# Patient Record
Sex: FEMALE | Race: WHITE | NOT HISPANIC OR LATINO | URBAN - METROPOLITAN AREA
[De-identification: names, ages, dates, MRNs, and addresses within clinical notes are randomized per-mention and may not be internally consistent; named-entity substitution may affect disease eponyms.]

---

## 2018-05-17 ENCOUNTER — OFFICE VISIT - RIVER FALLS (OUTPATIENT)
Dept: FAMILY MEDICINE | Facility: CLINIC | Age: 83
End: 2018-05-17

## 2018-05-17 ASSESSMENT — MIFFLIN-ST. JEOR: SCORE: 1048.91

## 2018-07-19 ENCOUNTER — OFFICE VISIT - RIVER FALLS (OUTPATIENT)
Dept: FAMILY MEDICINE | Facility: CLINIC | Age: 83
End: 2018-07-19

## 2018-07-19 ASSESSMENT — MIFFLIN-ST. JEOR: SCORE: 1044.37

## 2018-08-21 ENCOUNTER — OFFICE VISIT - RIVER FALLS (OUTPATIENT)
Dept: FAMILY MEDICINE | Facility: CLINIC | Age: 83
End: 2018-08-21

## 2018-08-21 ASSESSMENT — MIFFLIN-ST. JEOR: SCORE: 1046.41

## 2022-02-12 VITALS
BODY MASS INDEX: 33.53 KG/M2 | WEIGHT: 160.2 LBS | BODY MASS INDEX: 33.63 KG/M2 | HEIGHT: 58 IN | WEIGHT: 159.75 LBS | HEIGHT: 58 IN

## 2022-02-12 VITALS — BODY MASS INDEX: 33.74 KG/M2 | HEIGHT: 58 IN | WEIGHT: 160.75 LBS

## 2022-02-16 NOTE — PROGRESS NOTES
Patient:   KATHRIN BECERRA            MRN: [id number]            FIN: [id number]               Age:   85 years     Sex:  Female     :  1933   Associated Diagnoses:   None   Author:   Anya Calvillo      Doctor: Dr. [facility]   Patient Information  (Medicare and address information is on file)  Medicare HICN#: _  Address:_ City:_ State:_ Zip:_  Home Phone:_ Work Phone:_ Other Contact Phone:_    Diabetes self-management training (DSMT) and medical nutrition therapy (MNT) are individual and complementary services to improve diabetes care. For Medicare beneficiaries, both services can be ordered in the same year. Research indicates MNT combined with DSMT improves outcomes.    Diabetes Self-Managment Training (DSMT)  Medicare: 10 hours initial DSMT in 12 month period, plus 2 hours follow-up annually  *Check type of training services and number of hours requested:  _ Initial DSMT:  10 hours or _ no. hrs. requested  X Follow-up DSMT: X 2 hours or _ no. hrs. requested  _ Additional insulin training: _ no. hrs. requested    *Patients with special needs requiring individual DSMT  Check all special needs that apply:  _ Vision _ Hearing  _ Physical  _Cognitive Impairment  _ Language limitations X Other  No classes offered    *DSMT Content  X All ten content areas, as appropriate  _ Monitoring diabetes    _ Diabetes as disease process  _ Psychological adjustment _ Physical activity  _ Nutritional management  _ Goal setting, problem solving  _ Medications       _ Prevent, detect and treat acute complications  _ Preconception/pregnancy _ Prevent, detect and teat chronic complications     management or gestational     diabetes management    Medical Nutrition Therapy (MNT)  Medicare: 3 hours initial MNT in the first calendar year, plus two hours follow-up MNT annually. Additional MNT hours available for change in medical condition, treament and/or diagnosis.  *Check the type of MNT and/or number  of additional hours requested:  _ Initial MNT:   X Annual follow-up MNT  _ Additional MNT services in the same calendar year, per RD recommendations  _ number of additional hours requested    Please specify change in medical condition, treatment and/or diagnosis      *Diagnosis  Please send recent labs for patient eligibility & outcomes monitoring  _ Type 1 uncontrolled  _ Type 1 controlled  X Type 2 uncontrolled  _ Type 2 controlled  _ Gestational diabetes _ Other _    Complications/Comorbidities  Check all that apply:  X Hypertension   X Dyslipidemia _ Stroke      _ Nephropathy  _ Neuropathy    _ Retinopathy  _ Renal disease   _ CHD  _ PVD       _ Pregnancy  _ Non-healing wound X Obesity    _ Mental/affective disorder     _ Other _    Current Diabetes Medications  Specify type, dose and frequency  Oral: _  Insulin: Lantus 20u, Humalog per correction scale TID ac 151 - 174 +2, 175 - 199 +5, 200 or higher +6  Patient now uses: X Pen _ Needle _ Pump    Patient Behavior Goals/Plan of Care    To gradually lose weight and improve blood sugar control.  Minimize the risk for hypoglycemia and reduce risks of developing complications related to uncontrolled diabetes.    Signature and UPIN #: (UPIN and NPI are on file)  Group/Practice name, address and phone: [facility], [facility] [facility]  [phone] [phone]

## 2022-02-16 NOTE — PROGRESS NOTES
Patient:   KATHRIN BECERRA            MRN: [id number]            FIN: [id number]               Age:   85 years     Sex:  Female     :  1933   Associated Diagnoses:   Hypertension; Diabetes; Hyperlipidemia, mixed   Author:   Anya Calvillo      Visit Information   Visit type:  Follow up Diabetes Self Management Education - last seen by RD 18.    Accompanied by:  Son.    Source of history:  Self, Family member.    Referral source:  Micki TYSON, [place], Dr. [facility] .       Chief Complaint   DM Type II on insulin, CKD, Hyperlipidemia, HTN      History of Present Illness   Pt hospitalized 2018 due to hypoglycemia.  During hospitalization insulin regimen adjusted.  Pt does have dx of dementia and during hospitalization pt demonstrated accurate use of pen and son was instructed on monitor BG and insulin more closely.    Pt is here today for ongoing evaluation of BG control and DSME      A1C 9.4% 1/3/18   A1C 8.1% 18    Current insulin   Lantus 20   Humalog per correction scale TID ac   0 - 150   No correction  151 - 174  +2  175 - 199  +5  >= 200  +6    BG in office today 216m/dL   Testing 3 x/ day TID ac   7 day avg 155  14 day avg 160  30 day avg 177    Pt is working on diet but she also states she loves sweets and will have chocolate candy bars daily  am eating more eggs and 1 slice toast, noon meal sandwich, evening starch, protein and vegetable, pt is still drinking some juice because she likes it  Exercise: none      Health Status   Allergies:    Allergic Reactions (Selected)  Severity Not Documented  Avandia (No reactions were documented)  Byetta Prefilled Pen (Weakness)  Lasix (Nausea and vomiting)  Niaspan ER (Flush)   Problem list:    All Problems  Endometrial adenocarcinoma / SNOMED CT 562024127 / Confirmed  Generalized weakness / SNOMED CT 68032857 / Confirmed  CKD (chronic kidney disease) / SNOMED CT 5394931161 / Confirmed  Dementia / SNOMED CT 15405756 /  Confirmed  Diabetes / SNOMED CT 103068468 / Confirmed  Fatigue / SNOMED CT 860761370 / Confirmed  Shingles / SNOMED CT 6215015 / Confirmed  Hypertension / SNOMED CT 1207550615 / Confirmed  Severe diabetic hypoglycemia / SNOMED CT 009745165 / Confirmed  Hypokalemia / SNOMED CT 58446374 / Confirmed  Elevated lactic acid level / SNOMED CT 55642603 / Confirmed  Hyperlipidemia, mixed / SNOMED CT 585787285 / Confirmed  Actinic keratoses / SNOMED CT 8320861356 / Confirmed  Obstructive sleep apnea / SNOMED CT 015072819 / Confirmed  Osteoarthritis / SNOMED CT 2863829814 / Confirmed  Osteoporosis / SNOMED CT 345783506 / Confirmed  Postmenopausal bleeding / SNOMED CT 835076901 / Confirmed  Asymptomatic PVCs / SNOMED CT 08719364 / Confirmed  Resolved: Basal cell carcinoma (BCC) of right ear / SNOMED CT 9988315060  Resolved: Bell's palsy / SNOMED CT 605728707  Resolved: Cholelithiasis / SNOMED CT 929836507  Resolved: Cataract / SNOMED CT 156655604  Resolved: Bronchitis, chronic / SNOMED CT 818398947  Resolved: Diastolic dysfunction / SNOMED CT 0911848  Resolved: Fracture / SNOMED CT 276688339  Resolved: Radial head fracture / SNOMED CT 127803984  Resolved: History of tobacco use / SNOMED CT 2810643846  Resolved: Breast cancer / SNOMED CT 617054049  Resolved: Tubal pregnancy / SNOMED CT 306043864  Resolved: Ventricular hypertrophy / SNOMED CT 742153040  Canceled: CKD (chronic kidney disease) / SNOMED CT 3325465707      Histories   Past Medical History:    Active  Postmenopausal bleeding (207230140)  Osteoarthritis (3026131265)  Comments:  4/30/2018 CDT 10:56 AM CDT - Cheri Padilla  Knee  Fatigue (036783012)  Diabetes (539426141)  Comments:  4/30/2018 CDT 10:52 AM CDT - Cheri Padilla  Type 2 diabetes mellitus with chronic kidney disease.  Actinic keratoses (0733203274)  Hypertension (0720645530)  Hyperlipidemia, mixed (092761609)  Osteoporosis (014261344)  Obstructive sleep apnea (048595555)  Comments:  4/30/2018 CDT 11:06 AM CDT -  Cheri Padilla  Uses CPAP  Endometrial adenocarcinoma (717950473)  Shingles (4415686)  Comments:  4/30/2018 CDT 11:15 AM Cheri Jackson  Right arm.  Dementia (14750599)  CKD (chronic kidney disease) (3504296945)  Comments:  4/30/2018 CDT 11:53 AM Cheri Jackson  Stage 3.  GFR 30-59 m/min (HRC)  Generalized weakness (62570221)  Elevated lactic acid level (58844846)  Hypokalemia (37009512)  Severe diabetic hypoglycemia (400955061)  Resolved  Cataract (203242827): Onset on 6/2/2011 at 78 years.  Resolved.  Radial head fracture (541206084): Onset in 2001 at 67 years.  Resolved.  Comments:  4/30/2018 CDT 11:13 AM Cheri Jackson  Left wrist  Bell's palsy (533117059): Onset in 1979 at 45 years.  Resolved.  Comments:  4/30/2018 CDT 11:10 AM Cheri Jackson  Left  Fracture (100602416): Onset in 1972 at 38 years.  Resolved.  Comments:  4/30/2018 CDT 11:14 AM Cheri Jackson  Tibia/fibula  Tubal pregnancy (473095316): Onset in 1958 at 24 years.  Resolved.  Basal cell carcinoma (BCC) of right ear (9811269683):  Resolved in 2010 at 76 years.  Comments:  4/30/2018 CDT 11:08 AM Cheri Jackson  Right superior helix.  Treated with Mohs.  Cholelithiasis (933801695):  Resolved.  Diastolic dysfunction (1516695):  Resolved.  Comments:  4/30/2018 CDT 11:12 AM Cheri Jackson  Seen on EKG.  Bronchitis, chronic (580072332):  Resolved.  History of tobacco use (4233295114):  Resolved.  Breast cancer (726205769):  Resolved.  Ventricular hypertrophy (322573537):  Resolved.  Comments:  4/30/2018 CDT 11:17 AM Cheri Jackson  By EKG   Family History:    Heart disease  Sister  Diabetes  Grandmother (M)  Grandmother (P)  Mother  Sister  Sister  Son  Colon cancer  Father     Procedure history:    CT angiography of head and neck (SNOMED CT 3449744387) on 4/12/2018 at 85 Years.  Endometrial biopsy (SNOMED CT 1212964008) on 2/15/2018 at 84 Years.  Comments:  4/30/2018 11:43 AM - Cheri Padilla  Atypical complex hyperplasia with a  squamous morule formation and mucinous metaplasia, in the background of disordered proliferative endometrium.  Dilation and curettage (SNOMED CT 94830164) on 12/20/2017 at 84 Years.  Comments:  4/30/2018 11:44 AM - Cheri Padilla  With hysteroscopy.  Mammogram (SNOMED CT 80766) on 6/5/2015 at 82 Years.  Comments:  4/30/2018 11:45 AM - Cheri Padilla  Negative  Polysomnogram (SNOMED CT 255609477) in the month of 10/2005 at 72 Years.  Endometrial biopsy (SNOMED CT 0512054646) in the month of 8/1999 at 66 Years.  Mastectomy (SNOMED CT 4700701426) in 1977 at 44 Years.  Comments:  4/30/2018 11:48 AM - Cheri Padilla  Right breast  Salpingo-oophorectomy (SNOMED CT 396101496) in 1958 at 25 Years.  Mohs surgery (SNOMED CT 5823541984).  Comments:  4/30/2018 11:09 AM - Cheri Padilla  Right superior helix.  Basal cell carcinoma.   Social History:        Home and Environment Assessment            Marital status: .  Lives with Self.        Physical Examination   Measurements from flowsheet : Measurements   7/19/2018 3:11 PM CDT Height Measured - Standard 58 in    Weight Measured - Standard 159.75 lb    BSA 1.72 m2    Body Mass Index 33.38 kg/m2  HI         Health Maintenance      Recommendations     Pending (in the next year)        OverDue           Tetanus Vaccine due  06/24/16  and every 10  year(s)        Due            DM - Communication with Managing Provider due  07/19/18  and every 1  year(s)           DM - Eye Exam due  07/19/18  and every 1  year(s)           DM - Foot Exam due  07/19/18  and every 1  year(s)           DM - HgbA1c due  07/19/18  Variable frequency           DM - Microalbumin due  07/19/18  and every 1  year(s)           Depression Screen (Female) due  07/19/18  and every 1  year(s)           Fall Risk Screen (Female) due  07/19/18  and every 1  year(s)           HIV Screen (if sexually active) (Female) due  07/19/18  and every 1  year(s)           High Blood Pressure Screen (Female) due  07/19/18  and  "every 1  year(s)           Lipid Disorders Screen (Female) due  07/19/18  and every 1  year(s)           Osteoporosis Screen due  07/19/18  and every 2  year(s)           STD Counseling (if sexually active) (Female) due  07/19/18  and every 1  year(s)           Syphilis Screen (if sexually active) (Female) due  07/19/18  and every 1  year(s)           Tobacco Use Screen (Female) due  07/19/18  and every 1  year(s)           Zoster/Shingles Vaccine due  07/19/18  One-time only        Due In Future            Influenza Vaccine not due until  09/20/18  and every 1  year(s)     Satisfied (in the past 1 year)        Satisfied            Body Mass Index Check (Female) on  07/19/18.           Body Mass Index Check (Female) on  05/17/18.           Influenza Vaccine on  09/20/17.           Pneumococcal Vaccine on  09/20/17.          Impression and Plan   Diagnosis     Hypertension ([id number]-IQ I10).     Diabetes (XSY40-FW E11.9).     Hyperlipidemia, mixed (IXY54-FW E78.2).         Counseled: Patient, Further instruction on AADE7 Self Care Behaviors    Healthy Eating - Provided additional meal and snack ideas.  Again gave basic education on how foods/ fluids affect glucose.  Developed a list with pt on snacks that are lower in carbs where pt would not need to cover snack with insulin and help stabelize glucose.  Pt is also regular juice and is recommended to choose lower carb options.    Being Active - Encouraged walking q hr for a couple of minutes  Monitoring - Reviewed ecommended testing schedule and blood glucose target levels.    Taking Medication - Discussed potential options to add a base dose of Humalog to meals but that was too confusing for pt - decided to slightly adjust \"sliding scale\" and follow up in a couple of months.  Pt's BG fairly well controlled in am - will keep Lantus dose at 20u   Problem Solving -  medical support team assistance, resources provided (written); good control of stress  Healthy Coping - " support of friends, family, and medical support team   Reducing Risks - Detailed education on potential complications associated with uncontrolled diabetes and prevention recommendations.  Recommendations include: annual eye exam, good oral cares with brushing BID and flossing daily, routine dental apts, good foot care tips and shoewear - discussed monofilament test yearly.  Importance of adequate sleep and good control of BG to prevent developing complications related to uncontrolled DM including nephropathy, neuropathy, retinopathy, and cardiovascular disease.  Weight loss goal of 7 - 10% of current body weight pounds as a reasonable goal to help increase insulin sensitivity      Goals:   1.  Practice healthy stress management and get good quality sleep with the goal of 7-8 hours per night.    2.  Increase activity as able 5 min q hr x 4 hrs x 3 days/ week   3.  Eat in a healthy way, per food guide pyramid.  Lower carb snack and fluids, small portion of potato   4.  Pt to monitor BG TID.  BG goals: Fasting and before meals 100 - 140 mg/dL, 2 hrs after the start of a meal 100 - 160 mg/dL.    5.  Goal weight 150#   6.  Lantus 20u  Humalog per correction scale TID ac   0 - 150   +1  151 - 174  +3  175 - 199  +5  >= 200  +7  7.  Follow up in 3 mo          Professional Services   Time spent with pt 60 min   cc Dr. Sweet   cc Dr. Mathew

## 2022-02-16 NOTE — PROGRESS NOTES
Patient:   KATHRIN BECERRA            MRN: [id number]            FIN: [id number]               Age:   85 years     Sex:  Female     :  1933   Associated Diagnoses:   Hypertension; Diabetes; Hyperlipidemia, mixed   Author:   Anya Calvillo      Visit Information   Visit type:  Medical Nutrition Therapy for diabetes management .    Accompanied by:  Son.    Source of history:  Self, Family member.    Referral source:  Micki TYSON, [place], [facility] .       Chief Complaint   DM Type II on insulin, CKD, Hyperlipidemia, HTN      History of Present Illness   Pt hospitalized 2018 due to hypoglycemia.  During hospitalization insulin regimen adjusted - pt's son notes her Lantus is at about 1/2 as much as it was.  Pt does have dx of dementia and during hospitalization pt demonstrated accurate use of pen and son has become more involved in pt's care.        A1C  9.4%   1/3/18   A1C  8.1%  18  A1C  8.5%  18    Current insulin   Lantus 20   Humalog per correction scale TID ac   0 - 150   +1  151 - 174  +3  175 - 199  +5  >= 200  +7    BG in office today 176m/dL   Testing 3 x/ day TID ac   7 day avg 162  14 day avg 164  30 day avg 165 slightly improved     Pt is working on diet but she also states she loves sweets and will have chocolate candy bars daily  am eating more eggs and 1 slice toast, noon meal sandwich, evening starch, protein and vegetable, did stop drinking juice   Exercise: none      Health Status   Allergies:    Allergic Reactions (Selected)  Severity Not Documented  Avandia (No reactions were documented)  Byetta Prefilled Pen (Weakness)  Lasix (Nausea and vomiting)  Niaspan ER (Flush)   Problem list:    All Problems  Endometrial adenocarcinoma / SNOMED CT 293758357 / Confirmed  Generalized weakness / SNOMED CT 71499948 / Confirmed  CKD (chronic kidney disease) / SNOMED CT 9239384994 / Confirmed  Dementia / SNOMED CT 25088342 / Confirmed  Diabetes / SNOMED CT 896738718 /  Confirmed  Fatigue / SNOMED CT 927003467 / Confirmed  Shingles / SNOMED CT 1363056 / Confirmed  Hypertension / SNOMED CT 6876426510 / Confirmed  Severe diabetic hypoglycemia / SNOMED CT 816744958 / Confirmed  Hypokalemia / SNOMED CT 78466889 / Confirmed  Elevated lactic acid level / SNOMED CT 94560026 / Confirmed  Hyperlipidemia, mixed / SNOMED CT 818091093 / Confirmed  Actinic keratoses / SNOMED CT 5111744979 / Confirmed  Obstructive sleep apnea / SNOMED CT 042476383 / Confirmed  Osteoarthritis / SNOMED CT 9188840601 / Confirmed  Osteoporosis / SNOMED CT 326552581 / Confirmed  Postmenopausal bleeding / SNOMED CT 904478772 / Confirmed  Asymptomatic PVCs / SNOMED CT 15446530 / Confirmed  Resolved: Basal cell carcinoma (BCC) of right ear / SNOMED CT 6239326634  Resolved: Bell's palsy / SNOMED CT 148367823  Resolved: Cholelithiasis / SNOMED CT 419178812  Resolved: Cataract / SNOMED CT 030368218  Resolved: Bronchitis, chronic / SNOMED CT 017320718  Resolved: Diastolic dysfunction / SNOMED CT 1479114  Resolved: Fracture / SNOMED CT 635054757  Resolved: Radial head fracture / SNOMED CT 070091848  Resolved: History of tobacco use / SNOMED CT 5264725485  Resolved: Breast cancer / SNOMED CT 003270997  Resolved: Tubal pregnancy / SNOMED CT 179338402  Resolved: Ventricular hypertrophy / SNOMED CT 906153886  Canceled: CKD (chronic kidney disease) / SNOMED CT 4933158131      Histories   Past Medical History:    Active  Postmenopausal bleeding (260256000)  Osteoarthritis (7960416989)  Comments:  4/30/2018 CDT 10:56 AM Cheri Jackson  Knee  Fatigue (491445950)  Diabetes (736362982)  Comments:  4/30/2018 CDT 10:52 AM Cheri Jackson  Type 2 diabetes mellitus with chronic kidney disease.  Actinic keratoses (3896422564)  Hypertension (9254256131)  Hyperlipidemia, mixed (419150119)  Osteoporosis (932325873)  Obstructive sleep apnea (741849425)  Comments:  4/30/2018 CDT 11:06 AM Cheri Jackson  Uses CPAP  Endometrial  adenocarcinoma (318011039)  Shingles (6357757)  Comments:  4/30/2018 CDT 11:15 AM Cheri Jackson  Right arm.  Dementia (74547816)  CKD (chronic kidney disease) (8447508211)  Comments:  4/30/2018 CDT 11:53 AM Cheri Jackson  Stage 3.  GFR 30-59 m/min (HRC)  Generalized weakness (48651662)  Elevated lactic acid level (63346195)  Hypokalemia (18685806)  Severe diabetic hypoglycemia (037362451)  Resolved  Cataract (911260501): Onset on 6/2/2011 at 78 years.  Resolved.  Radial head fracture (520545221): Onset in 2001 at 67 years.  Resolved.  Comments:  4/30/2018 CDT 11:13 AM Cheri Jackson  Left wrist  Bell's palsy (474040599): Onset in 1979 at 45 years.  Resolved.  Comments:  4/30/2018 CDT 11:10 AM Cheri Jackson  Left  Fracture (102939925): Onset in 1972 at 38 years.  Resolved.  Comments:  4/30/2018 CDT 11:14 AM Cheri Jackson  Tibia/fibula  Tubal pregnancy (301101607): Onset in 1958 at 24 years.  Resolved.  Basal cell carcinoma (BCC) of right ear (1025483278):  Resolved in 2010 at 76 years.  Comments:  4/30/2018 CDT 11:08 AM Cheri Jackson  Right superior helix.  Treated with Mohs.  Cholelithiasis (127812407):  Resolved.  Diastolic dysfunction (6345894):  Resolved.  Comments:  4/30/2018 CDT 11:12 AM Cheri Jackson  Seen on EKG.  Bronchitis, chronic (104990711):  Resolved.  History of tobacco use (9435159453):  Resolved.  Breast cancer (123894916):  Resolved.  Ventricular hypertrophy (075861654):  Resolved.  Comments:  4/30/2018 CDT 11:17 AM Cheri Jackson  By EKG   Family History:    Heart disease  Sister  Diabetes  Grandmother (M)  Grandmother (P)  Mother  Sister  Sister  Son  Colon cancer  Father     Procedure history:    CT angiography of head and neck ([facility] CT 3971752332) on 4/12/2018 at 85 Years.  Endometrial biopsy (SNOMED CT 4357736694) on 2/15/2018 at 84 Years.  Comments:  4/30/2018 11:43 AM - Cheri Padilla  Atypical complex hyperplasia with a squamous morule formation and mucinous  metaplasia, in the background of disordered proliferative endometrium.  Dilation and curettage (SNOMED CT 36684851) on 12/20/2017 at 84 Years.  Comments:  4/30/2018 11:44 AM - Cheri Padilla  With hysteroscopy.  Mammogram (SNOMED CT 58041) on 6/5/2015 at 82 Years.  Comments:  4/30/2018 11:45 AM - Cheri Padilla  Negative  Polysomnogram (SNOMED CT 675223935) in the month of 10/2005 at 72 Years.  Endometrial biopsy (SNOMED CT 9879797655) in the month of 8/1999 at 66 Years.  Mastectomy (SNOMED CT 7624793695) in 1977 at 44 Years.  Comments:  4/30/2018 11:48 AM - Cheri Padilla  Right breast  Salpingo-oophorectomy (SNOMED CT 395843962) in 1958 at 25 Years.  Mohs surgery (SNOMED CT 3811625870).  Comments:  4/30/2018 11:09 AM Cheri Box  Right superior helix.  Basal cell carcinoma.   Social History:        Home and Environment Assessment            Marital status: .  Lives with Self.        Physical Examination   VS/Measurements      Health Maintenance      Recommendations     Pending (in the next year)        OverDue           Tetanus Vaccine due  06/24/16  and every 10  year(s)        Due            DM - Communication with Managing Provider due  08/23/18  and every 1  year(s)           DM - Eye Exam due  08/23/18  and every 1  year(s)           DM - Foot Exam due  08/23/18  and every 1  year(s)           DM - HgbA1c due  08/23/18  Variable frequency           DM - Microalbumin due  08/23/18  and every 1  year(s)           Depression Screen (Female) due  08/23/18  and every 1  year(s)           Fall Risk Screen (Female) due  08/23/18  and every 1  year(s)           HIV Screen (if sexually active) (Female) due  08/23/18  and every 1  year(s)           High Blood Pressure Screen (Female) due  08/23/18  and every 1  year(s)           Lipid Disorders Screen (Female) due  08/23/18  and every 1  year(s)           Osteoporosis Screen due  08/23/18  and every 2  year(s)           STD Counseling (if sexually active) (Female) due   "08/23/18  and every 1  year(s)           Syphilis Screen (if sexually active) (Female) due  08/23/18  and every 1  year(s)           Tobacco Use Screen (Female) due  08/23/18  and every 1  year(s)           Zoster/Shingles Vaccine due  08/23/18  One-time only        Near Due            Influenza Vaccine near due  09/20/18  and every 1  year(s)        Due In Future            Body Mass Index Check (Female) not due until  08/21/19  and every 1  year(s)     Satisfied (in the past 1 year)        Satisfied            Body Mass Index Check (Female) on  08/21/18.           Body Mass Index Check (Female) on  07/19/18.           Body Mass Index Check (Female) on  05/17/18.           Influenza Vaccine on  09/20/17.           Pneumococcal Vaccine on  09/20/17.          Impression and Plan   Diagnosis     Hypertension (PSU25-QG I10).     Diabetes (UJE43-OL E11.9).     Hyperlipidemia, mixed (RAL02-PU E78.2).         Counseled: Patient, Further instruction on AADE7 Self Care Behaviors    Healthy Eating - Provided additional meal and snack ideas.  Again gave basic education on how foods/ fluids affect glucose.  Reviewed snacks that are lower in carbs where pt would not need to cover snack with insulin and help stablize glucose.    Being Active - Encouraged walking q hr for a couple of minutes  Monitoring - Reviewed ecommended testing schedule and blood glucose target levels.    Taking Medication - Again will slightly adjust \"sliding scale\" and follow up in a couple of months.  Pt's BG fairly well controlled in am - will keep Lantus dose at 20u   Problem Solving -  medical support team assistance, resources provided (written); good control of stress  Healthy Coping - support of friends, family, and medical support team   Reducing Risks - Detailed education on potential complications associated with uncontrolled diabetes and prevention recommendations.  Recommendations include: annual eye exam, good oral cares with brushing BID and " flossing daily, routine dental apts, good foot care tips and shoewear - discussed monofilament test yearly.  Importance of adequate sleep and good control of BG to prevent developing complications related to uncontrolled DM including nephropathy, neuropathy, retinopathy, and cardiovascular disease.  Weight loss goal of 7 - 10% of current body weight pounds as a reasonable goal to help increase insulin sensitivity      Goals:   1.  Practice healthy stress management and get good quality sleep with the goal of 7-8 hours per night.    2.  Increase activity as able 5 min q hr x 4 hrs x 3 days/ week   3.  Eat in a healthy way, per food guide pyramid.  Lower carb snack and fluids, small portion of potato   4.  Pt to monitor BG TID.  BG goals: Fasting and before meals 100 - 140 mg/dL, 2 hrs after the start of a meal 100 - 160 mg/dL.    5.  Goal weight 150#   6.  Lantus 20u  Humalog per correction scale TID ac   0 - 150   +4  151 - 174  +6  175 - 199  +8  >= 200  +10  7.  Follow up in 3 mo          Professional Services   Time spent with pt 60 min   cc Dr. Sweet   cc Dr. Mathew

## 2022-02-16 NOTE — PROGRESS NOTES
Patient:   KATHRIN BECERRA            MRN: [id number]            FIN: [id number]               Age:   85 years     Sex:  Female     :  1933   Associated Diagnoses:   Hypertension; Diabetes; Hyperlipidemia, mixed   Author:   Anya Calvillo      Visit Information   Visit type:  Diabetes Self Management Education .    Accompanied by:  Family member, son.    Source of history:  Self, Family member.    Referral source:  Micki TYSON, [place], [facility] .       Chief Complaint   DM Type II on insulin, CKD, Hyperlipidemia, HTN      History of Present Illness   Pt hospitalized 2018 due to hypoglycemia.  Pt and her son live together and son could not walk pt up.  No glucogon pen in the house.  During hospitalization insulin regemen adjusted.  Pt does have dx of dementia and during hospitalization pt demonstrated accurate use of pen and son was instructed on monitor BG and insulin more closely.    Pt is here today for evaluation of BG control and DSME      A1C 9.4% 1/3/18   A1C 8.1% 18    Current insulin   Lantus 20   Humalog per correction scale TID ac   0 - 150   No correction  151 - 174  +1  175 - 199  +5  >= 200  +6     this am and 180 HS prior   Testing 3-4x/ day   7 day avg 185  14 day avg 169  30 day avg 161    Pt did have DQ treat on Mother's Day -  that evening - did not have insulin prior   am readings 113 - 159  noon 130 - 204  evening 98 - 230 (pt will have carb afternoon snack - very evident in glucose report)    - 185    Pt does not have a good understanding of how food affects glycemic control besides sweets.    Exercise: none      Health Status   Allergies:    Allergic Reactions (Selected)  Severity Not Documented  Avandia (No reactions were documented)  Byetta Prefilled Pen (Weakness)  Lasix (Nausea and vomiting)  Niaspan ER (Flush)   Problem list:    All Problems (Selected)  Endometrial adenocarcinoma / SNOMED CT 106436671 / Confirmed  Generalized  weakness / SNOMED CT 44197749 / Confirmed  CKD (chronic kidney disease) / SNOMED CT 2473924806 / Confirmed  Dementia / SNOMED CT 21902255 / Confirmed  Diabetes / SNOMED CT 572742322 / Confirmed  Fatigue / SNOMED CT 034243357 / Confirmed  Shingles / SNOMED CT 5893481 / Confirmed  Hypertension / SNOMED CT 0532199271 / Confirmed  Severe diabetic hypoglycemia / SNOMED CT 932618835 / Confirmed  Hypokalemia / SNOMED CT 97327777 / Confirmed  Elevated lactic acid level / SNOMED CT 36873138 / Confirmed  Hyperlipidemia, mixed / SNOMED CT 186770329 / Confirmed  Actinic keratoses / SNOMED CT 8801908559 / Confirmed  Obstructive sleep apnea / SNOMED CT 221894556 / Confirmed  Osteoarthritis / SNOMED CT 4010025072 / Confirmed  Osteoporosis / SNOMED CT 810491913 / Confirmed  Postmenopausal bleeding / SNOMED CT 788887223 / Confirmed  Asymptomatic PVCs / SNOMED CT 06290157 / Confirmed      Histories   Past Medical History:    Active  Postmenopausal bleeding (401445579)  Osteoarthritis (7318253978)  Comments:  4/30/2018 CDT 10:56 AM Cheri Jackson  Knee  Fatigue (078669930)  Diabetes (733823544)  Comments:  4/30/2018 CDT 10:52 AM Cheri Jackson  Type 2 diabetes mellitus with chronic kidney disease.  Actinic keratoses (3559024337)  Hypertension (3670919034)  Hyperlipidemia, mixed (772376104)  Osteoporosis (731750644)  Obstructive sleep apnea (711642589)  Comments:  4/30/2018 CDT 11:06 AM Cheri Jackson  Uses CPAP  Endometrial adenocarcinoma (296752206)  Shingles (9682771)  Comments:  4/30/2018 CDT 11:15 AM Cheri Jackson  Right arm.  Dementia (35212118)  CKD (chronic kidney disease) (7868314144)  Comments:  4/30/2018 CDT 11:53 AM Cheri Jackson  Stage 3.  GFR 30-59 m/min (HRC)  Generalized weakness (41965512)  Elevated lactic acid level (71117298)  Hypokalemia (41328744)  Severe diabetic hypoglycemia (782038765)  Resolved  Cataract (453846523): Onset on 6/2/2011 at 78 years.  Resolved.  Radial head fracture (609453860):  Onset in 2001 at 67 years.  Resolved.  Comments:  4/30/2018 CDT 11:13 AM ONEIDA Padilla [place]  Left wrist  Bell's palsy (962969206): Onset in 1979 at 45 years.  Resolved.  Comments:  4/30/2018 CDT 11:10 AM ONEIDA Padilla  Cheri  Left  Fracture (666509855): Onset in 1972 at 38 years.  Resolved.  Comments:  4/30/2018 CDT 11:14 AM Cheri Jackson  Tibia/fibula  Tubal pregnancy (167578019): Onset in 1958 at 24 years.  Resolved.  Basal cell carcinoma (BCC) of right ear (3942778070):  Resolved in 2010 at 76 years.  Comments:  4/30/2018 CDT 11:08 AM Cheri Jackson  Right superior helix.  Treated with Mohs.  Cholelithiasis (982059947):  Resolved.  Diastolic dysfunction (6748044):  Resolved.  Comments:  4/30/2018 CDT 11:12 AM Cheri Jackson  Seen on EKG.  Bronchitis, chronic (277696030):  Resolved.  History of tobacco use (8024068172):  Resolved.  Breast cancer (260323214):  Resolved.  Ventricular hypertrophy (911019776):  Resolved.  Comments:  4/30/2018 CDT 11:17 AM Cheri Jackson  By EKG   Family History:    Heart disease  Sister  Diabetes  Grandmother (M)  Grandmother (P)  Mother  Sister  Sister  Son  Colon cancer  Father     Procedure history:    CT angiography of head and neck (SNOMED CT 7448011296) on 4/12/2018 at 85 Years.  Endometrial biopsy (SNOMED CT 1563088825) on 2/15/2018 at 84 Years.  Comments:  4/30/2018 11:43 AM - Cheri Padilla  Atypical complex hyperplasia with a squamous morule formation and mucinous metaplasia, in the background of disordered proliferative endometrium.  Dilation and curettage (SNOMED CT 52849267) on 12/20/2017 at 84 Years.  Comments:  4/30/2018 11:44 AM Cheri Box  With hysteroscopy.  Mammogram (SNOMED CT 16417) on 6/5/2015 at 82 Years.  Comments:  4/30/2018 11:45 AM - Cheri Padilla  Negative  Polysomnogram (SNOMED CT 984705302) in the month of 10/2005 at 72 Years.  Endometrial biopsy ([facility] CT 7412431969) in the month of 8/1999 at 66 Years.  Mastectomy ([facility] CT 0346022268) in 1977  at 44 Years.  Comments:  4/30/2018 11:48 AM - Cheri Padilla  Right breast  Salpingo-oophorectomy (SNOMED CT 793696599) in 1958 at 25 Years.  Mohs surgery (SNOMED CT 8351387235).  Comments:  4/30/2018 11:09 AM - Cheri Padilla  Right superior helix.  Basal cell carcinoma.   Social History:        Home and Environment Assessment            Marital status: .  Lives with Self.        Physical Examination   Measurements from flowsheet : Measurements   5/18/2018 12:18 AM CDT Height Measured - Standard DateCorrection     Weight Measured - Standard DateCorrection     BSA DateCorrection     Body Mass Index DateCorrection    5/17/2018 12:18 AM CDT Height Measured - Standard 58 in (Modified)    Weight Measured - Standard 160.75 lb (Modified)    BSA 1.73 m2 (Modified)    Body Mass Index 33.59 kg/m2  HI (Modified)         Health Maintenance      Recommendations     Pending (in the next year)        OverDue           Tetanus Vaccine due  06/24/16  and every 10  year(s)        Due            DM - Communication with Managing Provider due  05/18/18  and every 1  year(s)           DM - Eye Exam due  05/18/18  and every 1  year(s)           DM - Foot Exam due  05/18/18  and every 1  year(s)           DM - HgbA1c due  05/18/18  Variable frequency           DM - Microalbumin due  05/18/18  and every 1  year(s)           Depression Screen (Female) due  05/18/18  and every 1  year(s)           Fall Risk Screen (Female) due  05/18/18  and every 1  year(s)           HIV Screen (if sexually active) (Female) due  05/18/18  and every 1  year(s)           High Blood Pressure Screen (Female) due  05/18/18  and every 1  year(s)           Lipid Disorders Screen (Female) due  05/18/18  and every 1  year(s)           Osteoporosis Screen due  05/18/18  and every 2  year(s)           STD Counseling (if sexually active) (Female) due  05/18/18  and every 1  year(s)           Syphilis Screen (if sexually active) (Female) due  05/18/18  and every 1   year(s)           Tobacco Use Screen (Female) due  05/18/18  and every 1  year(s)           Zoster/Shingles Vaccine due  05/18/18  One-time only        Due In Future            Influenza Vaccine not due until  09/20/18  and every 1  year(s)           Body Mass Index Check (Female) not due until  05/17/19  and every 1  year(s)     Satisfied (in the past 1 year)        Satisfied            Body Mass Index Check (Female) on  05/17/18.           Influenza Vaccine on  09/20/17.           Pneumococcal Vaccine on  09/20/17.        Impression and Plan   Diagnosis     Hypertension (SKJ24-OU I10).     Diabetes (GXY50-FU E11.9).     Hyperlipidemia, mixed (HQL66-IW E78.2).         Counseled: Patient, Further instruction on AADE7 Self Care Behaviors    Healthy Eating - Basic education on how foods/ fluids affect glucose.  Developed a list with pt on snacks that are lower in carbs where pt would not need to cover snack with insulin and help stabelize glucose.  Pt is also drinking regular soda and is recommended to choose lower carb options.    Being Active - Encouraged walking q hr for a couple of minutes  Monitoring - Reviewed ecommended testing schedule and blood glucose target levels.    Taking Medication - Discussed potential option to add a unit of Humalog for every carb choice consumed but this was too confusing for pt and adding to the correction scale also was not a good option at this time.  Pt's BG fairly well controlled in am - will keep Lantus dose at 20u and Humalog correction scale will also continue at this time due to pt understanding correction scale    Problem Solving -  medical support team assistance, resources provided (written, internet, apps); good control of stress  Healthy Coping - support of friends, family, and medical support team   Reducing Risks - Education on importance of good glucose control to help reduce the potential for developing microvascular and macrovacular complications associated with  high blood glucose over time.    Pt able to verbalize understanding of above education, handouts provided for additional resources.      Goals:   1.  Practice healthy stress management and get good quality sleep with the goal of 7-8 hours per night.    2.  Increase activity as able 5 min q hr x 4 hrs x 3 days/ week   3.  Eat in a healthy way, per food guide pyramid.  Lower carb snack and fluids, small portion of potato   4.  Pt to monitor BG TID.  BG goals: Fasting and before meals 100 - 140 mg/dL, 2 hrs after the start of a meal 100 - 160 mg/dL.    5.  Goal weight 150#   6.  Lantus 20u  Humalog per correction scale TID ac   0 - 150   No correction  151 - 174  +1  175 - 199  +5  >= 200  +6         Professional Services   Time spent with pt 60 min   cc Dr. Sweet   'cc Dr. Clemens